# Patient Record
Sex: MALE | Race: WHITE | Employment: OTHER | URBAN - METROPOLITAN AREA
[De-identification: names, ages, dates, MRNs, and addresses within clinical notes are randomized per-mention and may not be internally consistent; named-entity substitution may affect disease eponyms.]

---

## 2021-03-05 ENCOUNTER — TRANSCRIBE ORDER (OUTPATIENT)
Dept: SCHEDULING | Age: 56
End: 2021-03-05

## 2021-03-05 DIAGNOSIS — Z87.891 PERSONAL HISTORY OF TOBACCO USE, PRESENTING HAZARDS TO HEALTH: Primary | ICD-10-CM

## 2021-03-25 ENCOUNTER — NURSE NAVIGATOR (OUTPATIENT)
Dept: OTHER | Age: 56
End: 2021-03-25

## 2021-03-25 NOTE — PROGRESS NOTES
Referring Provider: Satish Rome MD      Lung Cancer Risk Profile:   Age: 60  Gender: Male  Height: 74\"  Weight: 170#    Smoking History:  Smoking Status: current use  # years smokin  # years quit: 0  Packs/day: 1.5  Pack years: 61    Patient discussed smoking cessation with PCP: Yes, per patient report    Patient participated in shared decision making process with PCP: Unknown    Patient is currently experiencing symptoms: No, per patient report    If yes what symptoms:     Co-Morbidities:      Cancer History:      Additional Risk Factors:   Exposure to second hand smoke, occupational exposure to asbestos and diesel fumes      Patient's smoking history discussed via phone. Patient meets LDCT lung cancer screening criteria.  Appointment scheduled for 3/29/2021 verified with SOLO ArreguinN, RN, [id number] [facility] Nurse Navigator

## 2021-03-29 ENCOUNTER — HOSPITAL ENCOUNTER (OUTPATIENT)
Dept: CT IMAGING | Age: 56
Discharge: HOME | End: 2021-03-29
Attending: GENERAL PRACTICE
Payer: MEDICAID

## 2021-03-29 VITALS — HEIGHT: 74 IN | BODY MASS INDEX: 21.82 KG/M2 | WEIGHT: 170 LBS

## 2021-03-29 DIAGNOSIS — Z87.891 PERSONAL HISTORY OF TOBACCO USE, PRESENTING HAZARDS TO HEALTH: ICD-10-CM

## 2021-03-29 PROCEDURE — 71271 CT THORAX LUNG CANCER SCR C-: CPT

## 2024-05-28 RX ORDER — CLOPIDOGREL BISULFATE 75 MG/1
TABLET ORAL
Qty: 90 TABLET | Refills: 3 | Status: SHIPPED | OUTPATIENT
Start: 2024-05-28

## 2024-10-14 ENCOUNTER — OFFICE VISIT (OUTPATIENT)
Age: 59
End: 2024-10-14

## 2024-10-14 VITALS
OXYGEN SATURATION: 98 % | DIASTOLIC BLOOD PRESSURE: 67 MMHG | SYSTOLIC BLOOD PRESSURE: 118 MMHG | BODY MASS INDEX: 20.79 KG/M2 | HEIGHT: 74 IN | HEART RATE: 67 BPM | WEIGHT: 162 LBS | TEMPERATURE: 97 F

## 2024-10-14 DIAGNOSIS — R01.1 SYSTOLIC MURMUR: ICD-10-CM

## 2024-10-14 DIAGNOSIS — R94.31 ABNORMAL ECG: ICD-10-CM

## 2024-10-14 DIAGNOSIS — R07.89 OTHER CHEST PAIN: Primary | ICD-10-CM

## 2024-10-14 DIAGNOSIS — E78.00 HYPERCHOLESTEREMIA: ICD-10-CM

## 2024-10-14 DIAGNOSIS — Z72.0 TOBACCO ABUSE: ICD-10-CM

## 2024-10-14 DIAGNOSIS — I25.10 CORONARY ARTERY DISEASE INVOLVING NATIVE CORONARY ARTERY OF NATIVE HEART WITHOUT ANGINA PECTORIS: ICD-10-CM

## 2024-10-14 DIAGNOSIS — R06.02 EXERTIONAL SHORTNESS OF BREATH: ICD-10-CM

## 2024-10-14 PROCEDURE — 99214 OFFICE O/P EST MOD 30 MIN: CPT | Performed by: INTERNAL MEDICINE

## 2024-10-14 RX ORDER — TRAZODONE HYDROCHLORIDE 150 MG/1
150 TABLET ORAL
COMMUNITY

## 2024-10-14 RX ORDER — PHENOL 1.4 %
AEROSOL, SPRAY (ML) MUCOUS MEMBRANE
COMMUNITY

## 2024-10-14 RX ORDER — SIMVASTATIN 80 MG
TABLET ORAL
COMMUNITY

## 2024-10-14 RX ORDER — ASPIRIN 81 MG/1
81 TABLET ORAL 2 TIMES DAILY
COMMUNITY

## 2024-10-14 ASSESSMENT — PATIENT HEALTH QUESTIONNAIRE - PHQ9
SUM OF ALL RESPONSES TO PHQ QUESTIONS 1-9: 0
1. LITTLE INTEREST OR PLEASURE IN DOING THINGS: NOT AT ALL
SUM OF ALL RESPONSES TO PHQ QUESTIONS 1-9: 0
2. FEELING DOWN, DEPRESSED OR HOPELESS: NOT AT ALL
SUM OF ALL RESPONSES TO PHQ9 QUESTIONS 1 & 2: 0
SUM OF ALL RESPONSES TO PHQ QUESTIONS 1-9: 0
SUM OF ALL RESPONSES TO PHQ QUESTIONS 1-9: 0

## 2024-10-14 ASSESSMENT — ENCOUNTER SYMPTOMS
EYES NEGATIVE: 1
GASTROINTESTINAL NEGATIVE: 1
ALLERGIC/IMMUNOLOGIC NEGATIVE: 1
SHORTNESS OF BREATH: 1

## 2025-03-16 RX ORDER — CLOPIDOGREL BISULFATE 75 MG/1
75 TABLET ORAL DAILY
Qty: 90 TABLET | Refills: 3 | Status: SHIPPED | OUTPATIENT
Start: 2025-03-16

## 2025-06-03 RX ORDER — SIMVASTATIN 80 MG
TABLET ORAL
Qty: 90 TABLET | Refills: 0 | Status: SHIPPED | OUTPATIENT
Start: 2025-06-03 | End: 2025-06-03 | Stop reason: SDUPTHER

## 2025-06-03 NOTE — TELEPHONE ENCOUNTER
PCP: Laura Abreu MD    Last appt:  10/14/2024   Future Appointments   Date Time Provider Department Center   10/13/2025  9:30 AM BS [facility] 1 [facility]   10/13/2025 10:15 AM Derrell Anderson Sr., MD [facility]       Requested Prescriptions     Pending Prescriptions Disp Refills    simvastatin (ZOCOR) 80 MG tablet 90 tablet 2     Sig: Take 1 tablet by mouth nightly       Request for a 30 or 90 day supply? Provider Discretion    Pharmacy: confirmed    Other Comments:n/a

## 2025-06-04 RX ORDER — SIMVASTATIN 80 MG
80 TABLET ORAL NIGHTLY
Qty: 90 TABLET | Refills: 2 | Status: SHIPPED | OUTPATIENT
Start: 2025-06-04